# Patient Record
Sex: FEMALE | URBAN - METROPOLITAN AREA
[De-identification: names, ages, dates, MRNs, and addresses within clinical notes are randomized per-mention and may not be internally consistent; named-entity substitution may affect disease eponyms.]

---

## 2020-11-19 ENCOUNTER — APPOINTMENT (RX ONLY)
Dept: URBAN - METROPOLITAN AREA CLINIC 55 | Facility: CLINIC | Age: 70
Setting detail: DERMATOLOGY
End: 2020-11-19

## 2020-11-19 DIAGNOSIS — Z41.9 ENCOUNTER FOR PROCEDURE FOR PURPOSES OTHER THAN REMEDYING HEALTH STATE, UNSPECIFIED: ICD-10-CM

## 2020-11-19 PROCEDURE — ? PRESCRIPTION

## 2020-12-17 ENCOUNTER — APPOINTMENT (RX ONLY)
Dept: URBAN - METROPOLITAN AREA CLINIC 55 | Facility: CLINIC | Age: 70
Setting detail: DERMATOLOGY
End: 2020-12-17

## 2020-12-17 DIAGNOSIS — Z41.9 ENCOUNTER FOR PROCEDURE FOR PURPOSES OTHER THAN REMEDYING HEALTH STATE, UNSPECIFIED: ICD-10-CM

## 2020-12-17 PROCEDURE — ? FILLERS

## 2020-12-17 PROCEDURE — ? DYSPORT

## 2020-12-17 NOTE — PROCEDURE: FILLERS
Include Cannula Size?: 27G
Jawline Filler Volume In Cc: 0
Expiration Date (Month Year): 5/31/23
Expiration Date (Month Year): 01/31/2023
Post-Care Instructions: After care instructions were provided to the patient.
Include Cannula Information In Note?: Yes
Lot #: [id number]
Filler: Shawn Rockwell
Include Cannula Information In Note?: No
Anesthesia Volume In Cc: 0.5
Lot #: [id number]
Map Statment: See Attach Map for Complete Details
Expiration Date (Month Year): 7/31/23
Anesthesia Type: 1% lidocaine with epinephrine
Consent: Written consent was obtained.
Detail Level: Simple

## 2020-12-17 NOTE — PROCEDURE: DYSPORT
Show Additional Area 3: Yes
Depressor Anguli Oris Units: 0
Consent: Written consent was obtained.
Expiration Date (Month Year): 08/31/21
Show Right And Left Periorbital Units: No
Post-Care Instructions: After care instructions were provided to the patient.
Lot #: [id number]
Glabellar Complex Units: 50
Detail Level: Simple
Additional Area 1 Location: chin
Dilution (U/0.1 Cc): 10

## 2021-10-18 ENCOUNTER — RX ONLY (OUTPATIENT)
Age: 71
Setting detail: RX ONLY
End: 2021-10-18

## 2021-11-10 ENCOUNTER — APPOINTMENT (RX ONLY)
Dept: URBAN - METROPOLITAN AREA CLINIC 55 | Facility: CLINIC | Age: 71
Setting detail: DERMATOLOGY
End: 2021-11-10

## 2021-11-10 DIAGNOSIS — Z41.9 ENCOUNTER FOR PROCEDURE FOR PURPOSES OTHER THAN REMEDYING HEALTH STATE, UNSPECIFIED: ICD-10-CM

## 2021-11-10 PROCEDURE — ? FILLERS

## 2021-11-10 PROCEDURE — ? DYSPORT

## 2021-11-10 NOTE — PROCEDURE: FILLERS
Additional Area 5 Volume In Cc: 0
Filler: Shawn Rockwell
Include Cannula Information In Note?: No
Consent: Written consent was obtained.
Lot #: [id number]
Include Cannula Size?: 27G
Post-Care Instructions: After care instructions were provided to the patient.
Expiration Date (Month Year): 3/31/2024
Include Cannula Information In Note?: Yes
Map Statment: See Attach Map for Complete Details
Anesthesia Volume In Cc: 0.5
Lot #: [id number]
Detail Level: Simple
Anesthesia Type: 1% lidocaine with epinephrine
Expiration Date (Month Year): 2/28/2024

## 2021-11-10 NOTE — PROCEDURE: DYSPORT
Detail Level: Simple
Left Periorbital Skin Units: 0
Show Levator Superior Units: Yes
Post-Care Instructions: After care instructions were provided to the patient.
Show Right And Left Brow Units: No
Consent: Written consent was obtained.
Additional Area 2 Location: chin
Dilution (U/0.1 Cc): 10
Additional Area 1 Location: upper lip
Glabellar Complex Units: 50
Expiration Date (Month Year): 7/31/2022
Lot #: [id number]

## 2022-01-05 ENCOUNTER — RX ONLY (OUTPATIENT)
Age: 72
Setting detail: RX ONLY
End: 2022-01-05

## 2022-01-05 RX ORDER — HYDROQUINONE 40 MG/G
CREAM TOPICAL
Qty: 57 | Refills: 2 | COMMUNITY
Start: 2022-01-05

## 2022-01-05 RX ORDER — HYDROQUINONE 40 MG/G
CREAM TOPICAL
Qty: 57 | Refills: 2

## 2022-02-11 ENCOUNTER — APPOINTMENT (RX ONLY)
Dept: URBAN - METROPOLITAN AREA CLINIC 55 | Facility: CLINIC | Age: 72
Setting detail: DERMATOLOGY
End: 2022-02-11

## 2022-02-11 DIAGNOSIS — Z41.9 ENCOUNTER FOR PROCEDURE FOR PURPOSES OTHER THAN REMEDYING HEALTH STATE, UNSPECIFIED: ICD-10-CM

## 2022-02-11 PROCEDURE — ? FRAXEL

## 2022-02-11 NOTE — PROCEDURE: FRAXEL
Medium Plastic Eye Shield Text: The ocular mucosa was anesthetized with tetracaine. Once adequate anesthesia was optained, medium plastic eye shields were inserted and remained in place until the procedure was completed.
Detail Level: Zone
Energy(Mj/Cm2): 1
Total Coverage: 30%
Topical Anesthesia Type: 20% benzocaine, 8% lidocaine, 4% tetracaine
Energy(Mj/Cm2): 10
Anesthesia Volume In Cc: 2.5
Depth In Microns (Use Numbers Only, No Special Characters Or $): 199
Number Of Passes: 4
External Cooling Fan Speed: 5
Location: full face
Treatment Level: 3
Total Energy In Kj (Optional- Don't Include Units): 1.88
Consent: Verbal consent obtained, risks reviewed including but not limited to pain and incomplete improvement.
Small Plastic Eye Shield Text: The ocular mucosa was anesthetized with tetracaine. Once adequate anesthesia was optained, small plastic eye shields were inserted and remained in place until the procedure was completed.
Indication: photodamage
Pre-Procedure Text (Will Appear After Anesthesia Text): Photos obtained. BLT removed with dry gauze. Face was cleansed with a gentle cleanser and then prepped with chlorhexidine gluconate.
Number Of Passes: 8
Was An Eye Shield Used?: No
Large Metal Eye Shield Text: The ocular mucosa was anesthetized with tetracaine. Once adequate anesthesia was optained, large metal eye shields were inserted and remained in place until the procedure was completed.
Length Of Topical Anesthesia Application (Optional): 60 minutes
Add Post-Care Below To The Note: Yes
Medium Metal Eye Shield Text: The ocular mucosa was anesthetized with tetracaine. Once adequate anesthesia was optained, medium metal eye shields were inserted and remained in place until the procedure was completed.
Post-Care Instructions: I reviewed with the patient in detail post-care instructions. Patient should avoid sun until area fully healed. Samples of Epidermal Repair, Journee and Vaseline sent home with patient.
Large Plastic Eye Shield Text: The ocular mucosa was anesthetized with tetracaine. Once adequate anesthesia was optained, large plastic eye shields were inserted and remained in place until the procedure was completed.
Location: neck
Small Metal Eye Shield Text: The ocular mucosa was anesthetized with tetracaine. Once adequate anesthesia was optained, small metal eye shields were inserted and remained in place until the procedure was completed.
Treatment Number: 6
Energy(Mj/Cm2): 15
Wavelength: 1927nm
Anesthesia Type: 1% lidocaine with epinephrine

## 2022-02-24 ENCOUNTER — APPOINTMENT (RX ONLY)
Dept: URBAN - METROPOLITAN AREA CLINIC 55 | Facility: CLINIC | Age: 72
Setting detail: DERMATOLOGY
End: 2022-02-24

## 2022-02-24 DIAGNOSIS — Z41.9 ENCOUNTER FOR PROCEDURE FOR PURPOSES OTHER THAN REMEDYING HEALTH STATE, UNSPECIFIED: ICD-10-CM

## 2022-02-24 PROCEDURE — ? COSMETIC FOLLOW-UP

## 2022-02-24 NOTE — PROCEDURE: COSMETIC FOLLOW-UP
Detail Level: Zone
Comments (Free Text): Post full face Fraxel 1927 photos obtained from treatment on 2/11/2022. Before and after photos reviewed with the patient. She is pleased with her results at this time, but would like single lentigo remaining on right cheek treated again. Will treat lentigo with Fraxel at no charge.

## 2022-02-25 ENCOUNTER — APPOINTMENT (RX ONLY)
Dept: URBAN - METROPOLITAN AREA CLINIC 55 | Facility: CLINIC | Age: 72
Setting detail: DERMATOLOGY
End: 2022-02-25

## 2022-02-25 DIAGNOSIS — Z41.9 ENCOUNTER FOR PROCEDURE FOR PURPOSES OTHER THAN REMEDYING HEALTH STATE, UNSPECIFIED: ICD-10-CM

## 2022-02-25 PROCEDURE — ? FRAXEL

## 2022-02-25 PROCEDURE — ? DIAGNOSIS COMMENT

## 2022-02-25 NOTE — PROCEDURE: FRAXEL
Location: Use Location Override
Energy(Mj/Cm2): 1
Location: neck
Treatment Level: 4
Treatment Level: 3
Medium Plastic Eye Shield Text: The ocular mucosa was anesthetized with tetracaine. Once adequate anesthesia was optained, medium plastic eye shields were inserted and remained in place until the procedure was completed.
Length Of Topical Anesthesia Application (Optional): 60 minutes
Large Plastic Eye Shield Text: The ocular mucosa was anesthetized with tetracaine. Once adequate anesthesia was optained, large plastic eye shields were inserted and remained in place until the procedure was completed.
Consent: Verbal consent obtained, risks reviewed including but not limited to pain and incomplete improvement.
Indication: photodamage
Anesthesia Type: 1% lidocaine with epinephrine
Wavelength: 1550nm
Was An Eye Shield Used?: No
Energy(Mj/Cm2): 20
Energy(Mj/Cm2): 10
Post-Care Instructions: I reviewed with the patient in detail post-care instructions. Patient should avoid sun until area fully healed. Samples of Epidermal Repair, Journee and Vaseline sent home with patient.
External Cooling Fan Speed: 5
Location Override: spot tx- right cheek
Total Energy In Kj (Optional- Don't Include Units): 0.04
Anesthesia Volume In Cc: 2.5
Small Plastic Eye Shield Text: The ocular mucosa was anesthetized with tetracaine. Once adequate anesthesia was optained, small plastic eye shields were inserted and remained in place until the procedure was completed.
Total Energy In Kj (Optional- Don't Include Units): 0.02
Pre-Procedure Text (Will Appear After Anesthesia Text): Photos obtained. BLT removed with dry gauze. Face was cleansed with a gentle cleanser and then prepped with chlorhexidine gluconate.
Large Metal Eye Shield Text: The ocular mucosa was anesthetized with tetracaine. Once adequate anesthesia was optained, large metal eye shields were inserted and remained in place until the procedure was completed.
Topical Anesthesia Type: 20% benzocaine, 8% lidocaine, 4% tetracaine
Detail Level: Zone
Medium Metal Eye Shield Text: The ocular mucosa was anesthetized with tetracaine. Once adequate anesthesia was optained, medium metal eye shields were inserted and remained in place until the procedure was completed.
Wavelength: 1927nm
Small Metal Eye Shield Text: The ocular mucosa was anesthetized with tetracaine. Once adequate anesthesia was optained, small metal eye shields were inserted and remained in place until the procedure was completed.
Add Post-Care Below To The Note: Yes

## 2022-09-22 ENCOUNTER — APPOINTMENT (RX ONLY)
Dept: URBAN - METROPOLITAN AREA CLINIC 55 | Facility: CLINIC | Age: 72
Setting detail: DERMATOLOGY
End: 2022-09-22

## 2022-09-22 DIAGNOSIS — Z41.9 ENCOUNTER FOR PROCEDURE FOR PURPOSES OTHER THAN REMEDYING HEALTH STATE, UNSPECIFIED: ICD-10-CM

## 2022-09-22 PROCEDURE — ? DYSPORT

## 2022-09-22 NOTE — PROCEDURE: DYSPORT
Right Periorbital Skin Units: 0
Show Levator Superior Units: Yes
Show Ucl Units: No
Glabellar Complex Units: 50
Detail Level: Detailed
Consent was obtained.
Show Inventory Tab: Show
Post-Care Instructions: Patient instructed to not lie down for 4 hours and limit physical activity for 24 hours.
Dilution (U/0.1 Cc): 10

## 2022-10-17 ENCOUNTER — APPOINTMENT (RX ONLY)
Dept: URBAN - METROPOLITAN AREA CLINIC 55 | Facility: CLINIC | Age: 72
Setting detail: DERMATOLOGY
End: 2022-10-17

## 2022-10-17 DIAGNOSIS — Z41.9 ENCOUNTER FOR PROCEDURE FOR PURPOSES OTHER THAN REMEDYING HEALTH STATE, UNSPECIFIED: ICD-10-CM

## 2022-10-17 PROCEDURE — ? IN-HOUSE DISPENSING PHARMACY

## 2022-10-17 PROCEDURE — ? INVENTORY

## 2022-10-17 NOTE — PROCEDURE: IN-HOUSE DISPENSING PHARMACY
Product 30 Price/Unit (In Dollars): 0
Product 42 Unit Type: mg
Product 7 Amount/Unit (Numbers Only): 30
Send Charges To Patient Encounter: No
Product 5 Application Directions: Apply nightly or as directed. Use SPF daily.
Product 7 Refills: 2
Product 4 Application Directions: Apply nightly or as directed.
Detail Level: Zone
Product 6 Refills: 11
Product 6 Unit Type: ml
Name Of Product 2: Dapsone / Spironolactone Gel
Name Of Product 3: Acne Triple Combination Gel
Name Of Product 5: Hydrating Tretinoin 0.025% Cream
Name Of Product 7: Lidocaine 23% / Tetracaine 7% Cream
Render Product Pricing In Note: Yes
Name Of Product 4: Hydroquinone 8% Emulsion
Name Of Product 6: Rosacea Triple Combination Gel
Name Of Product 1: Anti-Aging Brightening Cream
Product 7 Application Directions: Apply only as directed

## 2022-10-17 NOTE — PROCEDURE: IN-HOUSE DISPENSING PHARMACY
Product 72 Unit Type: mg
Product 69 Refills: 0
Product 2 Unit Type: ml
Product 6 Amount/Unit (Numbers Only): 30
Product 1 Application Directions: Apply nightly or as directed. Use SPF daily.
Product 3 Refills: 11
Render Refills If Set To 0: Yes
Name Of Product 6: Rosacea Triple Combination Gel
Product 4 Refills: 2
Product 4 Application Directions: Apply nightly or as directed.
Name Of Product 7: Lidocaine 23% / Tetracaine 7% Cream
Name Of Product 4: Hydroquinone 8% Emulsion
Send Charges To Patient Encounter: No
Name Of Product 2: Dapsone / Spironolactone Gel
Detail Level: Zone
Name Of Product 5: Hydrating Tretinoin 0.025% Cream
Product 7 Units Dispensed: 1
Name Of Product 1: Anti-Aging Brightening Cream
Name Of Product 3: Acne Triple Combination Gel
Product 7 Application Directions: Apply only as directed

## 2022-11-03 ENCOUNTER — APPOINTMENT (RX ONLY)
Dept: URBAN - METROPOLITAN AREA CLINIC 55 | Facility: CLINIC | Age: 72
Setting detail: DERMATOLOGY
End: 2022-11-03

## 2022-11-03 DIAGNOSIS — Z41.9 ENCOUNTER FOR PROCEDURE FOR PURPOSES OTHER THAN REMEDYING HEALTH STATE, UNSPECIFIED: ICD-10-CM

## 2022-11-03 PROCEDURE — ? FILLERS

## 2022-11-03 PROCEDURE — ? INVENTORY

## 2022-11-03 NOTE — PROCEDURE: FILLERS
Additional Area 4 Volume In Cc: 0
Detail Level: Detailed
Consent was obtained.
Post-Care Instructions: After care instructions were provided verbally and in writing.
Use Map Statement For Sites (Optional): No
Filler: RHA 4
Filler: RHA 3
Map Statment: See Attach Map for Complete Details
Include Cannula Size?: 25G
Include Cannula Information In Note?: Yes
Anesthesia Type: 1% lidocaine with epinephrine
Anesthesia Volume In Cc: 0.5

## 2023-02-21 ENCOUNTER — APPOINTMENT (RX ONLY)
Dept: URBAN - METROPOLITAN AREA CLINIC 55 | Facility: CLINIC | Age: 73
Setting detail: DERMATOLOGY
End: 2023-02-21

## 2023-02-21 DIAGNOSIS — Z41.9 ENCOUNTER FOR PROCEDURE FOR PURPOSES OTHER THAN REMEDYING HEALTH STATE, UNSPECIFIED: ICD-10-CM

## 2023-02-21 PROCEDURE — ? FRAXEL

## 2023-02-21 PROCEDURE — ? INVENTORY

## 2023-02-21 NOTE — PROCEDURE: FRAXEL
Treatment Level: 3
Post-Care Instructions: Topical anesthetic removed with dry gauze. Skin cleansed with gentle cleanser and prepped with Hibiclens. \\nAlastin Regenerating Skin Nectar and physical sunblock applied post treatment. Ice packs sent home with patient. \\nI reviewed with the patient in detail post-care instructions. Patient should avoid sun until area fully healed.
Location: lower face
Number Of Passes: 1
Wavelength: 1550nm
External Cooling Fan Speed: 5
Number Of Passes: 4
Energy(Mj/Cm2): 20
Large Metal Eye Shield Text: The ocular mucosa was anesthetized with tetracaine. Once adequate anesthesia was optained, large metal eye shields were inserted and remained in place until the procedure was completed.
Add Post-Care Below To The Note: No
Number Of Passes: 8
Consent obtained, risks reviewed.
Anesthesia Type: 1% lidocaine with epinephrine
Small Plastic Eye Shield Text: The ocular mucosa was anesthetized with tetracaine. Once adequate anesthesia was optained, small plastic eye shields were inserted and remained in place until the procedure was completed.
Wavelength: 1927nm
Medium Plastic Eye Shield Text: The ocular mucosa was anesthetized with tetracaine. Once adequate anesthesia was optained, medium plastic eye shields were inserted and remained in place until the procedure was completed.
Small Metal Eye Shield Text: The ocular mucosa was anesthetized with tetracaine. Once adequate anesthesia was optained, small metal eye shields were inserted and remained in place until the procedure was completed.
Total Energy In Kj (Optional- Don't Include Units): 0.03
Detail Level: Zone
Location Override: spot on cheek
Large Plastic Eye Shield Text: The ocular mucosa was anesthetized with tetracaine. Once adequate anesthesia was optained, large plastic eye shields were inserted and remained in place until the procedure was completed.
Medium Metal Eye Shield Text: The ocular mucosa was anesthetized with tetracaine. Once adequate anesthesia was optained, medium metal eye shields were inserted and remained in place until the procedure was completed.
Total Energy In Kj (Optional- Don't Include Units): 1.42
Indication: photodamage

## 2023-03-07 ENCOUNTER — APPOINTMENT (RX ONLY)
Dept: URBAN - METROPOLITAN AREA CLINIC 55 | Facility: CLINIC | Age: 73
Setting detail: DERMATOLOGY
End: 2023-03-07

## 2023-03-07 DIAGNOSIS — Z41.9 ENCOUNTER FOR PROCEDURE FOR PURPOSES OTHER THAN REMEDYING HEALTH STATE, UNSPECIFIED: ICD-10-CM

## 2023-03-07 PROCEDURE — ? INVENTORY

## 2023-03-10 ENCOUNTER — APPOINTMENT (RX ONLY)
Dept: URBAN - METROPOLITAN AREA CLINIC 55 | Facility: CLINIC | Age: 73
Setting detail: DERMATOLOGY
End: 2023-03-10

## 2023-03-10 DIAGNOSIS — Z41.9 ENCOUNTER FOR PROCEDURE FOR PURPOSES OTHER THAN REMEDYING HEALTH STATE, UNSPECIFIED: ICD-10-CM

## 2023-03-10 PROCEDURE — ? INVENTORY

## 2023-03-10 PROCEDURE — ? DAXXIFY

## 2023-03-10 NOTE — PROCEDURE: DAXXIFY
Show Right And Left Periorbital Units: No
Periorbital Skin Units: 0
Dilution (U/0.1 Cc): 4
Show Depressor Anguli Units: Yes
Post-Care Instructions: Patient was instructed to avoid rubbing or pressure on the area for 4 hours.
Consent was obtained.
Glabellar Complex Units: 40
Detail Level: Detailed
Show Inventory Tab: Show

## 2023-10-13 ENCOUNTER — APPOINTMENT (RX ONLY)
Dept: URBAN - METROPOLITAN AREA CLINIC 55 | Facility: CLINIC | Age: 73
Setting detail: DERMATOLOGY
End: 2023-10-13

## 2023-10-13 DIAGNOSIS — Z41.9 ENCOUNTER FOR PROCEDURE FOR PURPOSES OTHER THAN REMEDYING HEALTH STATE, UNSPECIFIED: ICD-10-CM

## 2023-10-13 PROCEDURE — ? INVENTORY

## 2023-10-13 PROCEDURE — ? DAXXIFY

## 2023-10-13 NOTE — PROCEDURE: DAXXIFY
Forehead Units: 0
Show Lcl Units: No
Detail Level: Detailed
Glabellar Complex Units: 40
Show Glabellar Units: Yes
Show Inventory Tab: Show
Consent was obtained.
Post-Care Instructions: Patient was instructed to avoid rubbing or pressure on the area for 4 hours.
Dilution (U/0.1 Cc): 4

## 2023-11-16 ENCOUNTER — APPOINTMENT (RX ONLY)
Dept: URBAN - METROPOLITAN AREA CLINIC 55 | Facility: CLINIC | Age: 73
Setting detail: DERMATOLOGY
End: 2023-11-16

## 2023-11-16 DIAGNOSIS — Z41.9 ENCOUNTER FOR PROCEDURE FOR PURPOSES OTHER THAN REMEDYING HEALTH STATE, UNSPECIFIED: ICD-10-CM

## 2023-11-16 PROCEDURE — 11310 SHAVE SKIN LESION 0.5 CM/<: CPT

## 2023-11-16 PROCEDURE — ? INVENTORY

## 2023-11-16 PROCEDURE — ? FILLERS

## 2023-11-16 PROCEDURE — ? SHAVE REMOVAL (NO PATHOLOGY)

## 2023-11-16 ASSESSMENT — LOCATION DETAILED DESCRIPTION DERM: LOCATION DETAILED: NASAL SUPRATIP

## 2023-11-16 ASSESSMENT — LOCATION ZONE DERM: LOCATION ZONE: NOSE

## 2023-11-16 ASSESSMENT — LOCATION SIMPLE DESCRIPTION DERM: LOCATION SIMPLE: NOSE

## 2023-11-16 NOTE — PROCEDURE: SHAVE REMOVAL (NO PATHOLOGY)
Medical Necessity Information: It is in your best interest to select a reason for this procedure from the list below. All of these items fulfill various CMS LCD requirements except the new and changing color options.
Include Z78.9 (Other Specified Conditions Influencing Health Status) As An Associated Diagnosis?: No
Detail Level: Detailed
Size Of Lesion In Cm: 0.2
X Size Of Lesion In Cm (Optional): 0
Anesthesia Type: 1% lidocaine with epinephrine
Hemostasis: Electrocautery
Wound Care: Petrolatum
Consent was obtained from the patient. The risks and benefits to therapy were discussed in detail. Specifically, the risks of infection, scarring, bleeding, prolonged wound healing, incomplete removal, allergy to anesthesia, nerve injury and recurrence were addressed. Prior to the procedure, the treatment site was clearly identified and confirmed by the patient. All components of Universal Protocol/PAUSE Rule completed.
Post-Care Instructions: I reviewed with the patient in detail post-care instructions. Patient is to keep the biopsy site dry overnight, and then apply bacitracin twice daily until healed. Patient may apply hydrogen peroxide soaks to remove any crusting.

## 2023-11-16 NOTE — PROCEDURE: FILLERS
Temple Hollows Filler Volume In Cc: 0
Include Cannula Information In Note?: No
Include Cannula Information In Note?: Yes
Anesthesia Type: 1% lidocaine with epinephrine
Anesthesia Volume In Cc: 0.5
Include Cannula Size?: 27G
Detail Level: Detailed
Consent was obtained.
Post-Care Instructions: After care instructions were provided verbally and in writing.
Filler: RHA 4
Map Statment: See Attach Map for Complete Details

## 2024-04-11 ENCOUNTER — APPOINTMENT (RX ONLY)
Dept: URBAN - METROPOLITAN AREA CLINIC 55 | Facility: CLINIC | Age: 74
Setting detail: DERMATOLOGY
End: 2024-04-11

## 2024-04-11 DIAGNOSIS — Z41.9 ENCOUNTER FOR PROCEDURE FOR PURPOSES OTHER THAN REMEDYING HEALTH STATE, UNSPECIFIED: ICD-10-CM

## 2024-04-11 PROCEDURE — ? FILLERS

## 2024-04-11 PROCEDURE — ? INVENTORY

## 2024-04-11 NOTE — PROCEDURE: FILLERS
Decollete Filler Volume In Cc: 0
Detail Level: Detailed
Include Cannula Size?: 27G
Include Cannula Information In Note?: No
Anesthesia Volume In Cc: 0.5
Include Cannula Information In Note?: Yes
Anesthesia Type: 1% lidocaine with epinephrine
Post-Care Instructions: After care instructions were provided verbally and in writing.
Consent was obtained.
Map Statment: See Attach Map for Complete Details
Filler: RHA 4

## 2024-05-30 ENCOUNTER — APPOINTMENT (RX ONLY)
Dept: URBAN - METROPOLITAN AREA CLINIC 55 | Facility: CLINIC | Age: 74
Setting detail: DERMATOLOGY
End: 2024-05-30

## 2024-05-30 DIAGNOSIS — Z41.9 ENCOUNTER FOR PROCEDURE FOR PURPOSES OTHER THAN REMEDYING HEALTH STATE, UNSPECIFIED: ICD-10-CM

## 2024-05-30 PROCEDURE — ? INVENTORY

## 2024-05-30 PROCEDURE — ? DAXXIFY

## 2024-05-30 NOTE — PROCEDURE: DAXXIFY
Inferior Lateral Orbicularis Oculi Units: 0
Show Additional Area 2: Yes
Consent was obtained.
Show Inventory Tab: Show
Show Right And Left Periorbital Units: No
Post-Care Instructions: Patient was instructed to avoid rubbing or pressure on the area for 4 hours.
Bill Summary Price Listed Below, Or Bill Total Of Units X Price Per Unit?: Bill Summary Price Below
Detail Level: Detailed
Glabellar Complex Units: 40
Dilution (U/0.1 Cc): 4

## 2025-03-27 ENCOUNTER — APPOINTMENT (OUTPATIENT)
Dept: URBAN - METROPOLITAN AREA CLINIC 55 | Facility: CLINIC | Age: 75
Setting detail: DERMATOLOGY
End: 2025-03-27

## 2025-03-27 DIAGNOSIS — Z41.9 ENCOUNTER FOR PROCEDURE FOR PURPOSES OTHER THAN REMEDYING HEALTH STATE, UNSPECIFIED: ICD-10-CM

## 2025-03-27 PROCEDURE — ? INVENTORY

## 2025-03-27 PROCEDURE — ? IN-HOUSE DISPENSING PHARMACY

## 2025-03-27 NOTE — PROCEDURE: IN-HOUSE DISPENSING PHARMACY
Product 41 Units Dispensed: 0
Product 7 Unit Type: ml
Product 20 Unit Type: mg
Name Of Product 2: Dapsone / Spironolactone Gel
Render Refills If Set To 0: Yes
Product 2 Amount/Unit (Numbers Only): 30
Product 7 Application Directions: Apply only as directed
Product 1 Refills: 2
Product 5 Application Directions: Apply nightly or as directed. Use SPF daily.
Product 6 Refills: 11
Name Of Product 1: Anti-Aging Brightening Cream
Name Of Product 4: Hydroquinone 8% Emulsion
Product 4 Application Directions: Apply nightly or as directed.
Name Of Product 5: Hydrating Tretinoin 0.025% Cream
Name Of Product 7: Lidocaine 23% / Tetracaine 7% Cream
Name Of Product 3: Acne Triple Combination Gel
Send Charges To Patient Encounter: No
Product 4 Units Dispensed: 1
Detail Level: Zone
Name Of Product 6: Rosacea Triple Combination Gel

## 2025-04-28 ENCOUNTER — APPOINTMENT (OUTPATIENT)
Dept: URBAN - METROPOLITAN AREA CLINIC 55 | Facility: CLINIC | Age: 75
Setting detail: DERMATOLOGY
End: 2025-04-28

## 2025-04-28 DIAGNOSIS — Z41.9 ENCOUNTER FOR PROCEDURE FOR PURPOSES OTHER THAN REMEDYING HEALTH STATE, UNSPECIFIED: ICD-10-CM

## 2025-04-28 PROCEDURE — ? INVENTORY

## 2025-04-28 PROCEDURE — ? FRAXEL

## 2025-04-28 NOTE — PROCEDURE: FRAXEL
Small Plastic Eye Shield Text: The ocular mucosa was anesthetized with tetracaine. Once adequate anesthesia was optained, small plastic eye shields were inserted and remained in place until the procedure was completed.
Number Of Passes: 8
Treatment Level: 1
Location: full face
Anesthesia Type: 1% lidocaine with epinephrine
Post-Care Instructions: Topical anesthetic removed with dry gauze. Skin cleansed with gentle cleanser and prepped with Hibiclens. \\nAlastin Regenerating Skin Nectar and physical sunblock applied post treatment. Ice packs sent home with patient. \\nI reviewed with the patient in detail post-care instructions. Patient should avoid sun until area fully healed.
Was An Eye Shield Used?: No
Medium Plastic Eye Shield Text: The ocular mucosa was anesthetized with tetracaine. Once adequate anesthesia was optained, medium plastic eye shields were inserted and remained in place until the procedure was completed.
Wavelength: 1550nm
Indication: resurfacing
Small Metal Eye Shield Text: The ocular mucosa was anesthetized with tetracaine. Once adequate anesthesia was optained, small metal eye shields were inserted and remained in place until the procedure was completed.
Energy(Mj/Cm2): 20
Large Plastic Eye Shield Text: The ocular mucosa was anesthetized with tetracaine. Once adequate anesthesia was optained, large plastic eye shields were inserted and remained in place until the procedure was completed.
Treatment Level: 3
Medium Metal Eye Shield Text: The ocular mucosa was anesthetized with tetracaine. Once adequate anesthesia was optained, medium metal eye shields were inserted and remained in place until the procedure was completed.
Consent obtained, risks reviewed.
Large Metal Eye Shield Text: The ocular mucosa was anesthetized with tetracaine. Once adequate anesthesia was optained, large metal eye shields were inserted and remained in place until the procedure was completed.
Energy(Mj/Cm2): 60
Number Of Passes: 4
External Cooling Fan Speed: 5
Detail Level: Zone
Treatment Level: 6

## 2025-05-20 ENCOUNTER — APPOINTMENT (OUTPATIENT)
Dept: URBAN - METROPOLITAN AREA CLINIC 55 | Facility: CLINIC | Age: 75
Setting detail: DERMATOLOGY
End: 2025-05-20

## 2025-05-20 DIAGNOSIS — Z41.9 ENCOUNTER FOR PROCEDURE FOR PURPOSES OTHER THAN REMEDYING HEALTH STATE, UNSPECIFIED: ICD-10-CM

## 2025-05-20 PROCEDURE — ? DYSPORT

## 2025-05-20 NOTE — PROCEDURE: DYSPORT
Mentalis Units: 0
Show Additional Area 2: Yes
Dilution (U/0.1 Cc): 10
Post-Care Instructions: Patient instructed to not lie down for 4 hours and limit physical activity for 24 hours.
Show Right And Left Periorbital Units: No
Detail Level: Detailed
Additional Area 1 Location: Chin
Glabellar Complex Units: 50
Consent was obtained.
Show Inventory Tab: Show

## 2025-06-16 ENCOUNTER — RX ONLY (RX ONLY)
Age: 75
End: 2025-06-16

## 2025-07-15 ENCOUNTER — APPOINTMENT (OUTPATIENT)
Dept: URBAN - METROPOLITAN AREA CLINIC 55 | Facility: CLINIC | Age: 75
Setting detail: DERMATOLOGY
End: 2025-07-15

## 2025-07-15 DIAGNOSIS — Z41.9 ENCOUNTER FOR PROCEDURE FOR PURPOSES OTHER THAN REMEDYING HEALTH STATE, UNSPECIFIED: ICD-10-CM

## 2025-07-15 PROCEDURE — ? FILLERS

## 2025-07-15 PROCEDURE — ? INVENTORY

## 2025-07-15 NOTE — PROCEDURE: FILLERS
Tear Troughs Filler Volume In Cc: 0
Include Cannula Information In Note?: Yes
Anesthesia Type: 1% lidocaine with epinephrine
Include Cannula Information In Note?: No
Anesthesia Volume In Cc: 0.5
Include Cannula Size?: 27G
Detail Level: Detailed
Filler: RHA 4
Filler: RHA 3
Map Statment: See Attach Map for Complete Details
Consent was obtained.
Post-Care Instructions: After care instructions were provided verbally and in writing.